# Patient Record
Sex: MALE | Race: BLACK OR AFRICAN AMERICAN | NOT HISPANIC OR LATINO
[De-identification: names, ages, dates, MRNs, and addresses within clinical notes are randomized per-mention and may not be internally consistent; named-entity substitution may affect disease eponyms.]

---

## 2021-09-21 ENCOUNTER — NON-APPOINTMENT (OUTPATIENT)
Age: 56
End: 2021-09-21

## 2021-09-22 ENCOUNTER — NON-APPOINTMENT (OUTPATIENT)
Age: 56
End: 2021-09-22

## 2021-09-22 PROBLEM — Z00.00 ENCOUNTER FOR PREVENTIVE HEALTH EXAMINATION: Status: ACTIVE | Noted: 2021-09-22

## 2021-10-01 ENCOUNTER — APPOINTMENT (OUTPATIENT)
Dept: VASCULAR SURGERY | Facility: CLINIC | Age: 56
End: 2021-10-01
Payer: COMMERCIAL

## 2021-10-01 VITALS
BODY MASS INDEX: 41.75 KG/M2 | SYSTOLIC BLOOD PRESSURE: 147 MMHG | HEART RATE: 83 BPM | DIASTOLIC BLOOD PRESSURE: 91 MMHG | HEIGHT: 73 IN | WEIGHT: 315 LBS

## 2021-10-01 DIAGNOSIS — Z86.39 PERSONAL HISTORY OF OTHER ENDOCRINE, NUTRITIONAL AND METABOLIC DISEASE: ICD-10-CM

## 2021-10-01 DIAGNOSIS — R20.8 OTHER DISTURBANCES OF SKIN SENSATION: ICD-10-CM

## 2021-10-01 DIAGNOSIS — Z78.9 OTHER SPECIFIED HEALTH STATUS: ICD-10-CM

## 2021-10-01 DIAGNOSIS — Z86.79 PERSONAL HISTORY OF OTHER DISEASES OF THE CIRCULATORY SYSTEM: ICD-10-CM

## 2021-10-01 PROCEDURE — 99243 OFF/OP CNSLTJ NEW/EST LOW 30: CPT

## 2021-10-01 PROCEDURE — 93923 UPR/LXTR ART STDY 3+ LVLS: CPT

## 2021-10-02 PROBLEM — Z86.79 HISTORY OF HYPERTENSION: Status: RESOLVED | Noted: 2021-10-02 | Resolved: 2021-10-02

## 2021-10-02 PROBLEM — Z78.9 NON-SMOKER: Status: ACTIVE | Noted: 2021-10-02

## 2021-10-02 PROBLEM — Z86.39 HISTORY OF DIABETIC NEUROPATHY: Status: RESOLVED | Noted: 2021-10-02 | Resolved: 2021-10-02

## 2021-10-02 PROBLEM — Z86.39 HISTORY OF TYPE 2 DIABETES MELLITUS: Status: RESOLVED | Noted: 2021-10-02 | Resolved: 2021-10-02

## 2021-10-02 PROBLEM — Z78.9 SOCIAL ALCOHOL USE: Status: ACTIVE | Noted: 2021-10-02

## 2021-10-02 PROBLEM — R20.8 BURNING SENSATION OF FEET: Status: ACTIVE | Noted: 2021-10-02

## 2021-10-02 RX ORDER — METFORMIN HYDROCHLORIDE 1000 MG/1
1000 TABLET, COATED ORAL
Refills: 0 | Status: ACTIVE | COMMUNITY

## 2021-10-02 RX ORDER — GABAPENTIN 100 MG/1
100 CAPSULE ORAL
Refills: 0 | Status: ACTIVE | COMMUNITY

## 2021-10-02 RX ORDER — HYDROCHLOROTHIAZIDE 50 MG/1
50 TABLET ORAL
Refills: 0 | Status: ACTIVE | COMMUNITY

## 2021-10-02 RX ORDER — CHROMIUM 200 MCG
TABLET ORAL
Refills: 0 | Status: ACTIVE | COMMUNITY

## 2021-10-02 RX ORDER — DULAGLUTIDE 3 MG/.5ML
3 INJECTION, SOLUTION SUBCUTANEOUS
Refills: 0 | Status: ACTIVE | COMMUNITY

## 2021-10-02 RX ORDER — ENALAPRIL MALEATE 20 MG/1
20 TABLET ORAL
Refills: 0 | Status: ACTIVE | COMMUNITY

## 2021-10-08 NOTE — HISTORY OF PRESENT ILLNESS
[FreeTextEntry1] : 55 y/o M, never a smoker, w/h/o DM, HTN, diabetic neuropathy who was referred by Dr. Reyna for evaluation of LEs burning sensation and to r/o PAD. He explains that he experiences pain, burning, tingling and numbness of his b/l legs and feet. He mentions that it occurs when he is sitting or lying down. He denies claudication, history of DVT, varicose veins, ulcerations, fever, or chills. He ambulates without difficulties.

## 2021-10-08 NOTE — ASSESSMENT
[FreeTextEntry1] : 57 y/o M, never a smoker, w/h/o DM, HTN, diabetic neuropathy who was referred by Dr. Reyna for evaluation of LEs burning sensation and to r/o PAD.\par Patient with palpable peripheral pulses.\par CLYDE/PVRs were done in the office demonstrating RLE CLYDE of 1.4 LLE w/ CLYDE of 1.3. Erroneously elevated pressures suggestive of arterial medial calcification\par I explained to the pt this symptoms are not vascular in origin and are likely secondary due to diabetic neuropathy. He will continue to follow up with us here PRN.

## 2021-10-08 NOTE — PROCEDURE
[FreeTextEntry1] : CLYDE/PVR: c/w RLE CLYDE of 1.4 LLE w/ CLYDE of 1.3. Erroneously elevated pressures suggestive of arterial medial calcification

## 2021-10-08 NOTE — REVIEW OF SYSTEMS
[Negative] : Heme/Lymph [Arthralgias] : arthralgias [Limb Pain] : limb pain [As Noted in HPI] : as noted in HPI [Leg Claudication] : no intermittent leg claudication

## 2021-10-08 NOTE — PHYSICAL EXAM
[Respiratory Effort] : normal respiratory effort [No Rash or Lesion] : No rash or lesion [Alert] : alert [Oriented to Person] : oriented to person [Oriented to Place] : oriented to place [Oriented to Time] : oriented to time [Calm] : calm [Normal Rate and Rhythm] : normal rate and rhythm [2+] : left 2+ [JVD] : no jugular venous distention  [de-identified] : WN/WD, NAD  [de-identified] : Supple  [de-identified] : NC/AT  [de-identified] : FROM 5/5 x 4.

## 2021-10-08 NOTE — ADDENDUM
[FreeTextEntry1] : This note was written by Valery Lyon on 10/01/2021 acting as scribe for Adrián Salinas M.D.\par \par I, Dr.Vicken Aguilera, personally performed the evaluation and management (E/M) services for this new patient.  That E/M includes conducting the initial examination, assessing all conditions, and establishing the plan of care.  Today, my ACP, HARI Ambrocio, was here to observe my evaluation and management services for this patient to be followed going forward.\par \par \par \par \par